# Patient Record
Sex: MALE | NOT HISPANIC OR LATINO | URBAN - METROPOLITAN AREA
[De-identification: names, ages, dates, MRNs, and addresses within clinical notes are randomized per-mention and may not be internally consistent; named-entity substitution may affect disease eponyms.]

---

## 2018-10-06 ENCOUNTER — EMERGENCY (EMERGENCY)
Facility: HOSPITAL | Age: 38
LOS: 1 days | Discharge: HOME | End: 2018-10-06
Admitting: EMERGENCY MEDICINE
Payer: MEDICAID

## 2018-10-06 VITALS
RESPIRATION RATE: 16 BRPM | HEART RATE: 102 BPM | SYSTOLIC BLOOD PRESSURE: 142 MMHG | OXYGEN SATURATION: 100 % | DIASTOLIC BLOOD PRESSURE: 95 MMHG | TEMPERATURE: 99 F

## 2018-10-06 VITALS
RESPIRATION RATE: 17 BRPM | SYSTOLIC BLOOD PRESSURE: 121 MMHG | TEMPERATURE: 98 F | DIASTOLIC BLOOD PRESSURE: 84 MMHG | HEART RATE: 92 BPM | OXYGEN SATURATION: 100 %

## 2018-10-06 PROCEDURE — 99283 EMERGENCY DEPT VISIT LOW MDM: CPT

## 2018-10-06 PROCEDURE — 73090 X-RAY EXAM OF FOREARM: CPT | Mod: 26,LT

## 2018-10-06 PROCEDURE — 73070 X-RAY EXAM OF ELBOW: CPT | Mod: 26,LT

## 2018-10-06 RX ORDER — IBUPROFEN 200 MG
600 TABLET ORAL ONCE
Qty: 0 | Refills: 0 | Status: COMPLETED | OUTPATIENT
Start: 2018-10-06 | End: 2018-10-06

## 2018-10-06 RX ORDER — ACETAMINOPHEN 500 MG
650 TABLET ORAL ONCE
Qty: 0 | Refills: 0 | Status: COMPLETED | OUTPATIENT
Start: 2018-10-06 | End: 2018-10-06

## 2018-10-06 RX ADMIN — Medication 650 MILLIGRAM(S): at 16:41

## 2018-10-06 RX ADMIN — Medication 600 MILLIGRAM(S): at 16:41

## 2018-10-06 NOTE — ED PROVIDER NOTE - PLAN OF CARE
Follow up with your PMD within 24-48 hrs or call our clinic at [phone]. Rest and elevate injured areas.  Apply ice for 20 minutes 2-3 times/day as needed for pain and swelling. Take Motrin 600mg every 6-8 hrs  as needed for pain with food.  Any worsening pain, swelling, weakness, numbness OR ANY NEW CONCERNING SYMPTOMS return to the Emergency Department. Ortho referral list provided if symptoms do not improve or worsen.

## 2018-10-06 NOTE — ED PROVIDER NOTE - EXTREMITY EXAM
left upper extremity findings/left forearm: no ecchymosis; point tenderness to mid ulnar aspect of forearm, no erythema, no edema, no obvious deformity, sensation intact, +2 pulse; left elbow w/ good ROM, no erythema, no edema, no obvious deformity; left shoulder with good ROM, no erythema, no edema, no obvious deformity; left hand: able to make a fist, strength 5/5, +flexion and extension of all digits.

## 2018-10-06 NOTE — ED PROVIDER NOTE - CARE PLAN
Principal Discharge DX:	Forearm injury, left, initial encounter  Assessment and plan of treatment:	Follow up with your PMD within 24-48 hrs or call our clinic at [phone]. Rest and elevate injured areas.  Apply ice for 20 minutes 2-3 times/day as needed for pain and swelling. Take Motrin 600mg every 6-8 hrs  as needed for pain with food.  Any worsening pain, swelling, weakness, numbness OR ANY NEW CONCERNING SYMPTOMS return to the Emergency Department. Ortho referral list provided if symptoms do not improve or worsen.

## 2018-10-06 NOTE — ED PROVIDER NOTE - OBJECTIVE STATEMENT
39 yo M with PMH of HTN who presents to the ER c/o s/p fall with left forearm pain today. Pt states onset of sudden. Reports character of pain is aching, intermittent,  5/10 in severity, non-radiating, worse with movement. Admits taking Advil without much improvement. Pt reports slipped and fall in bathtub, strike left mid forearm to side of bath tub, braced with right hand on the fall without landing on bottom, no LOC, no head trauma. Denies any prodrome prior to fall. Denies any fever, chills, n/v/d/c, headache, dizziness, chest pain, sob, abdominal pain, weakness, numbness, dysuria, leg swelling, recent travel or any other complaints.

## 2018-10-06 NOTE — ED PROVIDER NOTE - MEDICAL DECISION MAKING DETAILS
37 yo M with PMH of HTN who presents to the ER c/o s/p fall with left forearm pain today. Well appearing male, s/p slip & fall, strike left mid forearm on side of bathtub, able to get up and ambulatory, no LOC, no head trauma, didn't landed on buttocks, braced with right hand; denies any prodrome prior to fall. +tenderness on exam without obvious deformity. Plan: xray, pain control, and ortho referral.

## 2018-10-06 NOTE — ED PROVIDER NOTE - PROGRESS NOTE DETAILS
FABY DON: Patient reassessed, sitting comfortably in chair in NAD, denies any complaints. States feeling better, symptoms improved. Xray incomplete, spoke with radiology, elbow xray order, states no acute fx noted. Pt is medically stable for discharge and follow up with PMD and ortho. The patient was given verbal and written discharge instructions. Specifically, instructions when to return to the ED and when to seek follow-up from their pcp was discussed. Any specialty follow-up was discussed, including how to make an appointment.  Instructions were discussed in simple, plain language and was understood by the patient. The patient understands that should their symptoms worsen or any new symptoms arise, they should return to the ED immediately for further evaluation. All pt's questions were answered. Patient verbalizes understanding.

## 2021-11-16 PROBLEM — I10 ESSENTIAL (PRIMARY) HYPERTENSION: Chronic | Status: ACTIVE | Noted: 2018-10-06

## 2021-11-19 DIAGNOSIS — Z01.818 ENCOUNTER FOR OTHER PREPROCEDURAL EXAMINATION: ICD-10-CM

## 2021-11-19 PROBLEM — Z00.00 ENCOUNTER FOR PREVENTIVE HEALTH EXAMINATION: Status: ACTIVE | Noted: 2021-11-19

## 2021-11-20 ENCOUNTER — APPOINTMENT (OUTPATIENT)
Dept: DISASTER EMERGENCY | Facility: CLINIC | Age: 41
End: 2021-11-20

## 2021-11-21 LAB — SARS-COV-2 N GENE NPH QL NAA+PROBE: NOT DETECTED
